# Patient Record
Sex: FEMALE | Race: WHITE | URBAN - METROPOLITAN AREA
[De-identification: names, ages, dates, MRNs, and addresses within clinical notes are randomized per-mention and may not be internally consistent; named-entity substitution may affect disease eponyms.]

---

## 2017-03-20 ENCOUNTER — OFFICE VISIT (OUTPATIENT)
Dept: FAMILY MEDICINE CLINIC | Facility: CLINIC | Age: 52
End: 2017-03-20

## 2017-03-20 VITALS
BODY MASS INDEX: 31.4 KG/M2 | HEIGHT: 62 IN | SYSTOLIC BLOOD PRESSURE: 130 MMHG | DIASTOLIC BLOOD PRESSURE: 78 MMHG | TEMPERATURE: 97 F | RESPIRATION RATE: 20 BRPM | WEIGHT: 170.63 LBS | HEART RATE: 60 BPM

## 2017-03-20 DIAGNOSIS — I27.20 PULMONARY HYPERTENSION (HCC): ICD-10-CM

## 2017-03-20 DIAGNOSIS — G47.33 OBSTRUCTIVE SLEEP APNEA (ADULT) (PEDIATRIC): Primary | ICD-10-CM

## 2017-03-20 PROBLEM — E11.3599: Status: ACTIVE | Noted: 2017-03-20

## 2017-03-20 PROCEDURE — 99203 OFFICE O/P NEW LOW 30 MIN: CPT | Performed by: NURSE PRACTITIONER

## 2017-03-20 RX ORDER — FUROSEMIDE 80 MG
80 TABLET ORAL DAILY
COMMUNITY

## 2017-03-20 RX ORDER — ACETAMINOPHEN 325 MG/1
650 TABLET ORAL EVERY 4 HOURS PRN
COMMUNITY

## 2017-03-20 RX ORDER — MELATONIN
325
COMMUNITY

## 2017-03-20 RX ORDER — SERTRALINE HYDROCHLORIDE 25 MG/1
25 TABLET, FILM COATED ORAL DAILY
COMMUNITY

## 2017-03-20 RX ORDER — DOCUSATE SODIUM 100 MG/1
100 CAPSULE, LIQUID FILLED ORAL NIGHTLY
COMMUNITY

## 2017-03-20 RX ORDER — LEVOTHYROXINE SODIUM 0.07 MG/1
75 TABLET ORAL
COMMUNITY

## 2017-03-20 RX ORDER — FUROSEMIDE 40 MG/1
40 TABLET ORAL DAILY
COMMUNITY

## 2017-03-20 RX ORDER — ISOSORBIDE DINITRATE 20 MG/1
20 TABLET ORAL 3 TIMES DAILY
COMMUNITY

## 2017-03-20 RX ORDER — CHOLECALCIFEROL (VITAMIN D3) 1250 MCG
1 CAPSULE ORAL WEEKLY
COMMUNITY

## 2017-03-20 RX ORDER — TERAZOSIN 2 MG/1
4 CAPSULE ORAL NIGHTLY
COMMUNITY

## 2017-03-20 RX ORDER — HYDRALAZINE HYDROCHLORIDE 100 MG/1
100 TABLET, FILM COATED ORAL 3 TIMES DAILY
COMMUNITY

## 2017-03-20 RX ORDER — MAGNESIUM HYDROXIDE/ALUMINUM HYDROXICE/SIMETHICONE 120; 1200; 1200 MG/30ML; MG/30ML; MG/30ML
30 SUSPENSION ORAL 4 TIMES DAILY PRN
COMMUNITY

## 2017-03-20 RX ORDER — ATORVASTATIN CALCIUM 10 MG/1
5 TABLET, FILM COATED ORAL NIGHTLY
COMMUNITY

## 2017-03-20 RX ORDER — NIFEDIPINE 60 MG/1
120 TABLET, FILM COATED, EXTENDED RELEASE ORAL DAILY
COMMUNITY

## 2017-03-20 RX ORDER — FOLIC ACID 1 MG/1
1 TABLET ORAL DAILY
COMMUNITY

## 2017-03-20 RX ORDER — DOXEPIN HYDROCHLORIDE 50 MG/1
1 CAPSULE ORAL DAILY
COMMUNITY

## 2017-03-20 RX ORDER — INSULIN LISPRO 100 [IU]/ML
5 INJECTION, SOLUTION INTRAVENOUS; SUBCUTANEOUS
COMMUNITY

## 2017-03-20 RX ORDER — ASCORBIC ACID 250 MG
250 TABLET ORAL DAILY
COMMUNITY

## 2017-03-20 NOTE — PATIENT INSTRUCTIONS
Warned if still with sleep apnea and not using CPAP has 7 fold increased risk and heart attack, stroke, abnormal heart rhythm, and death. Increased risk of driving accidents. Advised to refrain from driving when sleepy.      Return 2 weeks after getting m

## 2017-03-20 NOTE — PROGRESS NOTES
HPI:    Patient ID: Eulalia Kingston is a 45year old female. HPI    Was seen by Dr. Vicky Danielle, cardiologist, in [place] with [facility] for pulmonary HTN and was recommended to have a sleep study.  Had the study 9/20/16 at [facility]. Has not seen anyon mouth 3 (three) times daily. Disp:  Rfl:    isosorbide dinitrate 20 MG Oral Tab Take 20 mg by mouth 3 (three) times daily. Disp:  Rfl:    Atorvastatin Calcium 10 MG Oral Tab Take 5 mg by mouth nightly.  Disp:  Rfl:    Terazosin HCl 2 MG Oral Cap Take 4 mg b fold increased risk and heart attack, stroke, abnormal heart rhythm, and death. Increased risk of driving accidents. Advised to refrain from driving when sleepy. Return 2 weeks after getting machine. Call us if any problems.          Meds This Vis

## 2017-03-21 NOTE — PROGRESS NOTES
Opened append to enter sleep study data - did not bring up the sleep flowsheet. Sleep study done at [facility] 9/20/16. AHI 17.9, REM AHI 31.3, RDI 28.7, REM RDI 33.2, Desat 80%, Sleep Eff 78.9%, PLMI 0.5. Report to be scanned to chart.  Kinza Lora

## 2017-05-02 ENCOUNTER — TELEPHONE (OUTPATIENT)
Dept: FAMILY MEDICINE CLINIC | Facility: CLINIC | Age: 52
End: 2017-05-02

## 2017-05-02 NOTE — TELEPHONE ENCOUNTER
Per Mila Hussein at [facility] sleep lab, they received an order from [facility] for patient to have a sleep study done. States the order does not specify a type of study but she is assuming it is a CPaP Therapy study.   Mila Hussein is needing a new order faxed to her a

## 2017-05-02 NOTE — TELEPHONE ENCOUNTER
Order reprinted. Added CPAP titration to the order (handwritten). Please fax. Thanks.  Kinza Bedoya, 05/02/2017, 1:03 PM

## (undated) NOTE — MR AVS SNAPSHOT
[address]  [place] [id number] [phone]               [facility] you for choosing [facility]   We are glad to serve you and happy to provide you with this summary o numbers can create reimbursement difficulties for you. To be done at [facility] - Dr. Ashli Barraza to read.     Functional Status questions complete?:      Assoc Dx:  Obstructive sleep apnea (adult) (pediatric) [G47.33], Pulmonary hypertension ([facility] [address] [ Commonly known as:  LASIX           * furosemide 40 MG Tabs   Take 40 mg by mouth daily. Commonly known as:  LASIX           HydrALAZINE HCl 100 MG Tabs   Take 100 mg by mouth 3 (three) times daily.    Commonly known as:  APRESOLINE           insulin glar Now link in the [employer] Pocket Change Card. Enter your [employer] Activation Code exactly as it appears below along with your Zip Code and Date of Birth to complete the sign-up process. If you do not sign up before the expiration date, you must request a new code.     Edwardo Chang priority on exercise in your life                    Visit [facility] online at  [email or web address]